# Patient Record
Sex: FEMALE | Race: BLACK OR AFRICAN AMERICAN | NOT HISPANIC OR LATINO | Employment: FULL TIME | URBAN - METROPOLITAN AREA
[De-identification: names, ages, dates, MRNs, and addresses within clinical notes are randomized per-mention and may not be internally consistent; named-entity substitution may affect disease eponyms.]

---

## 2022-05-25 DIAGNOSIS — N18.2 CHRONIC KIDNEY DISEASE, STAGE II (MILD): Primary | ICD-10-CM

## 2022-05-28 RX ORDER — OMEPRAZOLE 40 MG/1
40 CAPSULE, DELAYED RELEASE ORAL DAILY
COMMUNITY
Start: 2022-05-20

## 2022-05-28 RX ORDER — CYCLOBENZAPRINE HCL 10 MG
10 TABLET ORAL NIGHTLY
COMMUNITY
Start: 2022-05-19

## 2022-05-28 RX ORDER — MELOXICAM 15 MG/1
15 TABLET ORAL DAILY
COMMUNITY
Start: 2022-05-19 | End: 2022-06-01 | Stop reason: ALTCHOICE

## 2022-05-28 RX ORDER — CITALOPRAM 20 MG/1
20 TABLET, FILM COATED ORAL DAILY
COMMUNITY
Start: 2022-05-19

## 2022-05-28 RX ORDER — BUSPIRONE HYDROCHLORIDE 10 MG/1
10 TABLET ORAL 2 TIMES DAILY
COMMUNITY
Start: 2022-05-19

## 2022-05-28 RX ORDER — IRBESARTAN 300 MG/1
300 TABLET ORAL DAILY
COMMUNITY
Start: 2022-05-20

## 2022-05-28 RX ORDER — HYDROCHLOROTHIAZIDE 25 MG/1
25 TABLET ORAL DAILY
COMMUNITY
Start: 2022-05-19

## 2022-06-01 ENCOUNTER — OFFICE VISIT (OUTPATIENT)
Dept: NEPHROLOGY | Facility: CLINIC | Age: 52
End: 2022-06-01
Payer: COMMERCIAL

## 2022-06-01 VITALS
BODY MASS INDEX: 43.21 KG/M2 | OXYGEN SATURATION: 99 % | WEIGHT: 234.81 LBS | SYSTOLIC BLOOD PRESSURE: 129 MMHG | HEART RATE: 73 BPM | DIASTOLIC BLOOD PRESSURE: 80 MMHG | TEMPERATURE: 98 F | HEIGHT: 62 IN

## 2022-06-01 DIAGNOSIS — N18.2 CHRONIC KIDNEY DISEASE, STAGE II (MILD): Primary | ICD-10-CM

## 2022-06-01 DIAGNOSIS — I10 PRIMARY HYPERTENSION: ICD-10-CM

## 2022-06-01 PROCEDURE — 3066F PR DOCUMENTATION OF TREATMENT FOR NEPHROPATHY: ICD-10-PCS | Mod: CPTII,,, | Performed by: INTERNAL MEDICINE

## 2022-06-01 PROCEDURE — 1160F PR REVIEW ALL MEDS BY PRESCRIBER/CLIN PHARMACIST DOCUMENTED: ICD-10-PCS | Mod: CPTII,,, | Performed by: INTERNAL MEDICINE

## 2022-06-01 PROCEDURE — 4010F PR ACE/ARB THEARPY RXD/TAKEN: ICD-10-PCS | Mod: CPTII,,, | Performed by: INTERNAL MEDICINE

## 2022-06-01 PROCEDURE — 3074F PR MOST RECENT SYSTOLIC BLOOD PRESSURE < 130 MM HG: ICD-10-PCS | Mod: CPTII,,, | Performed by: INTERNAL MEDICINE

## 2022-06-01 PROCEDURE — 99213 PR OFFICE/OUTPT VISIT, EST, LEVL III, 20-29 MIN: ICD-10-PCS | Mod: ,,, | Performed by: INTERNAL MEDICINE

## 2022-06-01 PROCEDURE — 3066F NEPHROPATHY DOC TX: CPT | Mod: CPTII,,, | Performed by: INTERNAL MEDICINE

## 2022-06-01 PROCEDURE — 1160F RVW MEDS BY RX/DR IN RCRD: CPT | Mod: CPTII,,, | Performed by: INTERNAL MEDICINE

## 2022-06-01 PROCEDURE — 3008F PR BODY MASS INDEX (BMI) DOCUMENTED: ICD-10-PCS | Mod: CPTII,,, | Performed by: INTERNAL MEDICINE

## 2022-06-01 PROCEDURE — 3079F DIAST BP 80-89 MM HG: CPT | Mod: CPTII,,, | Performed by: INTERNAL MEDICINE

## 2022-06-01 PROCEDURE — 99213 OFFICE O/P EST LOW 20 MIN: CPT | Mod: ,,, | Performed by: INTERNAL MEDICINE

## 2022-06-01 PROCEDURE — 3008F BODY MASS INDEX DOCD: CPT | Mod: CPTII,,, | Performed by: INTERNAL MEDICINE

## 2022-06-01 PROCEDURE — 3079F PR MOST RECENT DIASTOLIC BLOOD PRESSURE 80-89 MM HG: ICD-10-PCS | Mod: CPTII,,, | Performed by: INTERNAL MEDICINE

## 2022-06-01 PROCEDURE — 1159F PR MEDICATION LIST DOCUMENTED IN MEDICAL RECORD: ICD-10-PCS | Mod: CPTII,,, | Performed by: INTERNAL MEDICINE

## 2022-06-01 PROCEDURE — 4010F ACE/ARB THERAPY RXD/TAKEN: CPT | Mod: CPTII,,, | Performed by: INTERNAL MEDICINE

## 2022-06-01 PROCEDURE — 3074F SYST BP LT 130 MM HG: CPT | Mod: CPTII,,, | Performed by: INTERNAL MEDICINE

## 2022-06-01 PROCEDURE — 1159F MED LIST DOCD IN RCRD: CPT | Mod: CPTII,,, | Performed by: INTERNAL MEDICINE

## 2022-06-01 NOTE — PROGRESS NOTES
Nephrology Clinic Note           Pt Name:  Tawnya Way  Pt :  1970  Pt MRN:  [id number]    Date: 2022  Provider: Satya Morrow      Chief Complaint:   Chief Complaint   Patient presents with    Chronic Kidney Disease     Stage 2. Cr. 1.01, GFR 74       HPI:  Tawnya Way is a 51 y.o. female presenting for management of hypertension chronic kidney disease stage III.  History is significant for hypertension the patient is coming for the second time. She was  referred from the primary doctor for abnormal renal function.  Creatinine up to 1.6, and in 2020, she denies any prior history for kidney problems.  Since her last visit no new medical events, reports good control of her blood pressure.   Today in the office no shortness of breath, no chest pain, no diarrhea, no dysuria or hematuria, mild swelling of the legs, good appetite.   Denies any NSAID usage.  Grandmother from her mother's side with end-stage renal disease on dialysis secondary to hypertension.      Review of Systems   Gen: no fever, chills, fatigue, weight loss/gain  HEENT: no vision changes, no hearing deficits, no nasal discharge  Respiratory: no cough, dyspnea  CVS: no chest pain, PND, orthopnea  Abd: no nausea, vomiting, abdominal pain, diarrhea, constipation  : no hematuria, dysuria, urinary retention  Ext: no edema, joint and muscle pains  Neuro: no weakness, no numbness  Skin: no rashes, no lesions  Psych: no depression, no anxiety    History:   Past Medical History:   Diagnosis Date    Anxiety disorder, unspecified     CKD (chronic kidney disease)     Essential (primary) hypertension     Fatigue     GERD (gastroesophageal reflux disease)     Mixed hyperlipidemia     Unspecified osteoarthritis, unspecified site     Vitamin deficiency      Past Surgical History:   Procedure Laterality Date     SECTION      CHOLECYSTECTOMY      COLONOSCOPY      HYSTERECTOMY      with ablation    KNEE  "SURGERY Right     TOTAL KNEE ARTHROPLASTY Right     TUBAL LIGATION       No family history on file.  Social History     Substance and Sexual Activity   Alcohol Use Not on file     Social History     Substance and Sexual Activity   Drug Use Not on file     Social History     Substance and Sexual Activity   Sexual Activity Not on file     has no history on file for sexual activity.  Social History     Tobacco Use   Smoking Status Never Smoker   Smokeless Tobacco Never Used       Allergies:  Review of patient's allergies indicates:   Allergen Reactions    Codeine        [unfilled]       Physical Exam  Vitals:   Vitals:    06/01/22 1602   BP: 129/80   Pulse: 73   Temp: 97.6 °F (36.4 °C)   SpO2: 99%   Weight: 106.5 kg (234 lb 12.8 oz)   Height: 5' 2" (1.575 m)   PainSc: 0-No pain     Body mass index is 42.95 kg/m².    Vital signs:   Wt Readings from Last 3 Encounters:   06/01/22 106.5 kg (234 lb 12.8 oz)     Temp Readings from Last 3 Encounters:   06/01/22 97.6 °F (36.4 °C)     BP Readings from Last 3 Encounters:   06/01/22 129/80     Pulse Readings from Last 3 Encounters:   06/01/22 73       @FLOWSTAT(5:24::1)@  GENERAL ASSESSMENT: awake and alert in no acute distress.  Eyes: anicteric sclera, no erythema or discharge.  HENT: Normocephalic, atraumatic, moist mucus membranes  Neck: Supple, no thyromegaly or lymphadenopathy   SKIN EXAM: no rash, ecchymosis.  Cardiovascular: regular rate and rhythm, S1 S2 heard.  No murmurs, rubs or gallops.  Respiratory: No rales, no wheezes or rhonchi  GI: BS+, NT, ND, no organomegaly.  : No Alexander   MSK: No edema.  No joint stiffness, effusions  NEURO: alert and oriented,  PSYCH: answers questions appropriately, organized thinking   Nails: No clubbing, " "no pallor       Labs/Tests:  @2YRLABRCNT(Na:3,K:3,CL:3,CO2:3,BUN:3,CREATININE:3,GLUCOSE:3,CALCIUM:3,PHOS:3,ALBUMIN:3,ANIONGAP:3,EGFRNONAA:3,EGFRAA:3,HGB:3,HGBA1C:3,TRANSFERRIN:3,IRON:3,TIBC:3,LABIRON:3,FERRITIN:3,TRIG:3,CHOL:3,HDL:3,LDLCALC:3,LABVLDL:3,NHDLCHOL:3)@   @2YRLABRCNT(pthi:*,[id number]:*)@  @2YRLABRCNT(PROCREA:3,PROTUR:3,CREATININEUR:3)@      @2YRLABRCNT(COLORUR:*,CLARITYUR:*,SPECGRAVUR:*,PHUR:*,PROTUR:*,BILIRUBINUR:*,BLOODUR:*,KETONESUR:*,UROB:*,NITRITEUR:*,LEUKOCYTESUR:*,HYCST:*,WBCUAMN:*,RBCUAMN:*,EPIUAMN:*,BACTM:*)@    "@2YRLABRCNT(URICACID:*)@    Renal  3/2021 - right kidney 9.5 cm, eft kidney 10.2 cm, simple renal cyst, no hydro or urinary retention:  Impression:       Assessment & Plan:      Visit Diagnosis  1. Chronic kidney disease, stage II (mild)    2. Primary hypertension          Plan    1. Hypertension-well control with current regime, will refill hctz 25 mg daily.  Advised if the bp is trending down below 110-120/70-80 can decrease her hctz to 12.5 mg daily Low-salt diet discussed with the patient.  2. Chronic kidney disease stage II , renal function acceptable at present on the baseline 1.01, talk about the risk factors associated with kidney failure.  Follow-up prn  3. Anemia - resolved           Orders this visit   No orders of the defined types were placed in this encounter.         Follow Up:   No follow-ups on file.    Satya Morrow M.D.  Nephrology  06/01/2022  4:05 PM          "